# Patient Record
Sex: MALE | Race: WHITE | Employment: FULL TIME | URBAN - METROPOLITAN AREA
[De-identification: names, ages, dates, MRNs, and addresses within clinical notes are randomized per-mention and may not be internally consistent; named-entity substitution may affect disease eponyms.]

---

## 2017-05-24 ENCOUNTER — HOSPITAL ENCOUNTER (OUTPATIENT)
Dept: NUTRITION | Age: 57
Discharge: HOME | End: 2017-05-24
Payer: COMMERCIAL

## 2017-05-24 PROCEDURE — 97802 MEDICAL NUTRITION INDIV IN: CPT

## 2017-05-24 NOTE — PROGRESS NOTES
[address] [place] [address], Sebastian Mitchell  Phone: [phone]  Fax: [phone]   Nutrition Assessment - Medical Nutrition Therapy   Outpatient Initial Evaluation         Patient Name: Kalil Stallings : 1960   Treatment Diagnosis: Diabetes Onset Date: 1 year ago   Referral Source: Mylo Soulier, DO Start of Care [facility]): 2017     Ht:  58 in  cm Wt: 208  lb  kg   BMI: 31.6  BMR   male 18 BMR female      PMHx includes: DM w/ peripheral neuropathy, HTN, HLD, tobacco use     Medications of Nutritional Significance:   Metformin, Januvia, Jardiance, Gabapentin, Lyrica     Subjective/Assessment:   60 yo male referred by Dr. Amber Cartwright for help with wt loss & blood sugar control-A1C 10.3 (17), 8.7 (16), 6.4 (16). Pt reports trying to lose wt and cut down on sugar-but it seems he is not aware of all carb sources. Pt also states he is always very stressed at work, and seems to have quite high anxiety around events that deviate from his normal routine. 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 He has also been experiencing pain from neuropathy in his feet recently. States he does not check his blood sugars and does not want to start. Pt may not be dependent on alcohol, but reports not being willing to cutback on regular intake. Overall, pt was pleasant to work with; he expressed understanding recommendations, seems motivated to bring A1C down, & compliance is anticipated. Current Eating Patterns: Pt typically eats 3 meals per day and 1-3 snacks. Meals are fairly well-balanced, but snacks could use work. Lately has been trying to snack on fruits (banana, pineapple). Discussed sources of carbohydrates thoroughly, and will work towards 2-step snacks with protein.  Pt also typically drinks 6 beers per night; has switched to Light beer for less calories [employer]). Pt does not like water, drinks coffee with non-dairy creamer & diet green tea throughout the day. Handouts/  Information Provided: [x]  Carbohydrates  [x]  Protein  []  Fiber  []  Serving Sizes  [x]  Meal and Snack Ideas  []  Food Journals [x]  Diabetes  []  Cholesterol  []  Sodium  []  Gen Nutr Guidelines  []  SBGM Guidelines  [x]  Others: Food label, Carb sources diagram, non-starchy vegetables list   Estimate Needs:   Calories: 1800  Protein: 135 Carbs: 180 Fat: 60   Kcal/day  g/day  g/day  g/day        percent: 25  45  30     Nutritional Goal - To promote lifestyle changes to result in:    [x]  Weight loss  [x]  Improved diabetic control  []  Decreased cholesterol levels  []  Decreased blood pressure  []  Weight maintenance []  Preventing any interactions associated with food allergies  []  Adequate weight gain toward goal weight  []  Other:        Recommendations: 1. Always pair carbohydrates with protein. 2. Educated pt on all carbohydrates found in foods and encouraged no more than 30-45 gm/meal and 15-25 gm/snack. 3. Continue reading food labels for total carbohydrates, paying attention to serving size. Information Reviewed with: pt   Potential Barriers to Learning: none     Dietitian Signature: Jitendra Pizano MS, RD Date: 5/24/2017   Follow-up: Thurs.  6/22/17 at 9:30a Time: 11:29 AM

## 2017-06-22 ENCOUNTER — HOSPITAL ENCOUNTER (OUTPATIENT)
Dept: NUTRITION | Age: 57
Discharge: HOME | End: 2017-06-22
Payer: COMMERCIAL

## 2017-06-22 PROCEDURE — 97803 MED NUTRITION INDIV SUBSEQ: CPT

## 2017-06-22 NOTE — PROGRESS NOTES
NUTRITION - DAILY TREATMENT NOTE  Patient Name: Jose Carlos Claytoninter         Date: 2017  : 1960    YES Patient  Verified  Insurance: Payor: Cindy Garduno / Plan: Dax Venegas / Product Type: HMO /    Diagnosis:    Diabetes   In time:   9:15a         Out time:  9:45a   Total Treatment Time (min):   30     SUBJECTIVE    Medication Changes/New allergies or changes in medical history, any new surgeries or procedures? NO    If yes, update Summary List   Subjective Functional Status/Changes:  []  No changes reported   Pt in for follow-up doing well with recommendations; seems to have met every goal set at assessment apmt. He has started pairing carbohydrates with protein at nearly every meal and snack. He has become more mindful of carbohydrate portion size. Cautioned pt not to cut out carbohydrates too drastically, educated on necessity in the body. Pt may be adding excessive high-fat protein sources (ie. Meat & cheese) to meals; discussed overall calorie balance. Pt has also been reading food labels, and has switched to Jeramie Trujillo beer for lowest carb count at a reasonable price. He reports losing wt & will see Dr. Jas Ordoñez next week for A1C recheck; suspect it should decrease based on diet recall. Will follow-up in 4 months to check in and review lab values - A1C(s), cholesterol, GFR.     Current Wt (lbs): Did not weigh Previous Wt (lbs): 208 Wt Change (lbs): n/a     OBJECTIVE    Patient Education:  [x]  Review current plan with patient   []  Other:    Handouts/  Information Provided: [x]  Carbohydrates  []  Protein  []  Fiber  []  Serving Sizes  []  Fluids  []  General guidelines []  Diabetes  []  Cholesterol  []  Sodium  []  SBGM  []  Food Journals  []  Others:    Estimated Needs: 1800 135 180 60    Calories Protein (gm) CHO (gm) Fat (gm)     ASSESSMENT    [x]  Pt Progressing Well []  Slow Progress []  No Progress    Other:    Understand Dietary       Changes/Recommendations []  No Change [x]  Improving []  N/A   Weight []  No Change [x]  Improving []  N/A   Glucose Levels []  No Change []  Improving [x]  N/A   Cholesterol Levels []  No Change []  Improving [x]  N/A     RECOMMENDATIONS    1. Keep in mind the bigger picture - more calories come from fats & alcohol, than carbs and protein. 2. Try not to cut out carbs too drastically, stick to set ranges. 3. Keep up the good work! PLAN    [x]  Continue on current plan []  Follow-up PRN   []  Discharge due to :    [x]  Next Appt[de-identified] Thurs.  10/5/17 at 9:30     Dietitian: Vero Alexandra MS, RD    Date: 6/22/2017 Time: 9:58 AM

## 2017-10-05 ENCOUNTER — APPOINTMENT (OUTPATIENT)
Dept: NUTRITION | Age: 57
End: 2017-10-05

## 2017-10-05 ENCOUNTER — HOSPITAL ENCOUNTER (OUTPATIENT)
Dept: NUTRITION | Age: 57
Discharge: HOME | End: 2017-10-05
Payer: COMMERCIAL

## 2017-10-05 PROCEDURE — 97803 MED NUTRITION INDIV SUBSEQ: CPT

## 2017-10-05 NOTE — PROGRESS NOTES
NUTRITION - FOLLOW-UP TREATMENT NOTE  Patient Name: Cindy Orosco         Date: 10/5/2017  : 1960    YES Patient  Verified  Diagnosis: Diabetes   In time:   9:30a            Out time:   10a   Total Treatment Time (min):   30     SUBJECTIVE/ASSESSMENT    Changes in medication or medical history? Any new allergies, surgeries or procedures? NO    If yes, update Summary List   Pt in for follow up doing well with everything. He missed his last doctor's appointment due to having to leave to make it in time for a conference call at work. He is waiting for a call back to reschedule. Want to see patients new labwork to determine if the strategies he has been using are effective. Will call pt to follow up about this in a couple months. He is trying to stay within carbohydrate ranges, pairing carbs with protein, having 3 meals per day and snacks when needed, increased vegetable intake and is losing wt. Pt does not have a glucometer at home. Current Wt: 202 (self-report) Previous Wt: 208 Wt Change: - 6     Achievement of Goals: 1. Keep in mind the bigger picture - more calories come from fats & alcohol, than carbs and protein. =met, continue  2. Try not to cut out carbs too drastically, stick to set ranges. =met, coninue           Patient Education:  [x]  Review current plan with patient   []  Other:    Handouts/  Information Provided: []  Carbohydrates  []  Protein  []  Fiber  []  Serving Sizes  []  Fluids  []  General guidelines []  Diabetes  []  Cholesterol  []  Sodium  []  SBGM  []  Food Journals  []  Others:      New Patient Goals: Reschedule missed doctor's appointment to get labwork done. Call to discuss labwork and if follow up is necessary at this time.      PLAN    [x]  Continue on current plan []  Follow-up PRN   []  Discharge due to :    [x]  Next appt: Call to schedule     Dietitian: Iva Chin MS, RD    Date: 10/5/2017 Time: 9:45 AM